# Patient Record
Sex: MALE | Race: WHITE | Employment: FULL TIME | URBAN - METROPOLITAN AREA
[De-identification: names, ages, dates, MRNs, and addresses within clinical notes are randomized per-mention and may not be internally consistent; named-entity substitution may affect disease eponyms.]

---

## 2019-07-29 ENCOUNTER — TRANSFERRED RECORDS (OUTPATIENT)
Dept: SURGERY | Facility: CLINIC | Age: 39
End: 2019-07-29

## 2019-09-11 ENCOUNTER — OFFICE VISIT (OUTPATIENT)
Dept: SURGERY | Facility: CLINIC | Age: 39
End: 2019-09-11
Payer: COMMERCIAL

## 2019-09-11 VITALS
WEIGHT: 158 LBS | RESPIRATION RATE: 16 BRPM | DIASTOLIC BLOOD PRESSURE: 70 MMHG | HEIGHT: 72 IN | HEART RATE: 69 BPM | SYSTOLIC BLOOD PRESSURE: 126 MMHG | BODY MASS INDEX: 21.4 KG/M2 | OXYGEN SATURATION: 98 %

## 2019-09-11 DIAGNOSIS — E04.1 THYROID NODULE: Primary | ICD-10-CM

## 2019-09-11 PROCEDURE — 99243 OFF/OP CNSLTJ NEW/EST LOW 30: CPT | Performed by: SURGERY

## 2019-09-11 RX ORDER — FLUTICASONE PROPIONATE 50 MCG
1 SPRAY, SUSPENSION (ML) NASAL
COMMUNITY
Start: 2016-07-26

## 2019-09-11 SDOH — HEALTH STABILITY: MENTAL HEALTH: HOW OFTEN DO YOU HAVE A DRINK CONTAINING ALCOHOL?: 2-4 TIMES A MONTH

## 2019-09-11 SDOH — HEALTH STABILITY: MENTAL HEALTH: HOW MANY STANDARD DRINKS CONTAINING ALCOHOL DO YOU HAVE ON A TYPICAL DAY?: 1 OR 2

## 2019-09-11 ASSESSMENT — MIFFLIN-ST. JEOR: SCORE: 1671.74

## 2019-09-11 NOTE — LETTER
"September 12, 2019          Maria Esther Welch MD  [facility] [place] [id number] [facility] [address]  [place] [address]      RE:   Long Rider 1980      Dear Colleague,    Thank you for referring your patient, Long Rider, to [facility]. Please see a copy of my visit note below.    History of Present Illness  Long Rider is a 39 year old male who is referred from Dr. Maria Esther Welch for surgery consultation regarding  multinodular goiter.  Long had a neck mass/thyroid nodule first identified in 2016.  It is enlarging and he is now symptomatic.     He denies fatigue, weight changes, heat/cold intolerance, bowel/skin changes or CVS symptoms.     He reports positional pressure neck discomfort.  He denies swallowing difficulty, shortness of breath, frequent throat clearing and hoarseness.     He does not have a family history of thyroid cancer.  He denies a personal history of radiation exposure.     Long is not on thyroid medications.  Thyroid medications include: None.  Long has no prior neck surgery..        Past Medical History   No past medical history on file.     Past Surgical History   No past surgical history on file.        Smoking History:  has never smoked.     Review Of Systems:    10 point ROS is negative except as stated in the History of the Present Illness      Physical Exam:  /70   Pulse 69   Resp 16   Ht 1.816 m (5' 11.5\")   Wt 71.7 kg (158 lb)   SpO2 98%   BMI 21.73 kg/m    Well developed, well nourished male in no apparent distress.  HEENT:  Normocephalic, atraumatic.                   Eyes without exophthalmos.  Neck:   Slender, long and Visible right goiter.              Range of motion is normal.              No neck masses.  Thyroid:  solitary nodule right at 5.5 cm, comes above the clavicles with swallowing..  Lymph:  No cervical adenopathy.  Respirations:  Unlabored.  Neurologic:  Alert.  Speech is clear.  Moves all " extremities with good strength  Skin:  Warm and dry.  Psychologic:  Alert and appropriate range of emotions.     Imaging:  All imaging personally reviewed with Long   Ultrasound: Right lobe with a 4.7 x 5.5 x 3.6 cm nodule, increased. Left lobe is normal except for a tiny 7 mm upper pole nodule and he has a 1.2 cm isthmus nodule.     Labs:  TSH 1.37     FNA Biopsy: benign (right) and non-diagnostic (isthmus) x 2.     Assessment and Plan:    Long has a sizable and symptomatic right  thyroid nodule .   I am recommending him for  right thyroid lobectomy and we would only perform a total thyroidectomy in the case of a proven malignancy.  The isthmus nodule can be removed with the right lobe.  Long is aware of the risks to the recurrent laryngeal nerve and to the parathyroid glands during surgery.  He is interested in proceeding with surgery sometime in the next several weeks.      Again, thank you for allowing me to participate in the care of your patient.      Sincerely,      Fidelia Vidal MD

## 2019-09-11 NOTE — PROGRESS NOTES
"History of Present Illness  Long Rider is a 39 year old male who is referred from Dr. Maria Esther Welch for surgery consultation regarding  multinodular goiter.  Long had a neck mass/thyroid nodule first identified in 2016.  It is enlarging and he is now symptomatic.    He denies fatigue, weight changes, heat/cold intolerance, bowel/skin changes or CVS symptoms.    He reports positional pressure neck discomfort.  He denies swallowing difficulty, shortness of breath, frequent throat clearing and hoarseness.    He does not have a family history of thyroid cancer.  He denies a personal history of radiation exposure.    Long is not on thyroid medications.  Thyroid medications include: None.  Long has no prior neck surgery..      No past medical history on file.  No past surgical history on file.    Smoking History:  has never smoked.    Review Of Systems:    10 point ROS is negative except as stated in the History of the Present Illness     Physical Exam:  /70   Pulse 69   Resp 16   Ht 1.816 m (5' 11.5\")   Wt 71.7 kg (158 lb)   SpO2 98%   BMI 21.73 kg/m    Well developed, well nourished male in no apparent distress.  HEENT:  Normocephalic, atraumatic.                   Eyes without exophthalmos.  Neck:   Slender, long and Visible right goiter.              Range of motion is normal.              No neck masses.  Thyroid:  solitary nodule right at 5.5 cm, comes above the clavicles with swallowing..  Lymph:  No cervical adenopathy.  Respirations:  Unlabored.  Neurologic:  Alert.  Speech is clear.  Moves all extremities with good strength  Skin:  Warm and dry.  Psychologic:  Alert and appropriate range of emotions.    Imaging:  All imaging personally reviewed with Long   Ultrasound: Right lobe with a 4.7 x 5.5 x 3.6 cm nodule, increased. Left lobe is normal except for a tiny 7 mm upper pole nodule and he has a 1.2 cm isthmus nodule.    Labs:  TSH 1.37    FNA Biopsy: benign (right) and non-diagnostic " (isthmus) x 2.    Assessment and Plan:    Long has a sizable and symptomatic right  thyroid nodule .   I am recommending him for  right thyroid lobectomy and we would only perform a total thyroidectomy in the case of a proven malignancy.  The isthmus nodule can be removed with the right lobe.  Long is aware of the risks to the recurrent laryngeal nerve and to the parathyroid glands during surgery.  He is interested in proceeding with surgery sometime in the next several weeks.    Fidelia Vidal MD  Please route or send dictated letter to:  Primary Care Provider (PCP) and Referring Provider    45 minutes spent with the patient, over 50% as counseling.

## 2019-09-12 ENCOUNTER — TELEPHONE (OUTPATIENT)
Dept: SURGERY | Facility: CLINIC | Age: 39
End: 2019-09-12

## 2019-09-12 NOTE — TELEPHONE ENCOUNTER
Type of surgery: Right thyroid lobectomy, possible total thyroidectomy  Location of surgery: [facility]  Date and time of surgery: 10/28/19 at 7:30am  Surgeon: Dr. Fidelia Vidal  Pre-Op Appt Date: Patient to schedule  Post-Op Appt Date: Patient to schedule   Packet sent out: Yes  Pre-cert/Authorization completed:  Not Applicable  Date: 9/12/19

## 2019-09-12 NOTE — PROGRESS NOTES
Service Date: 2019            Maria Esther Welch MD   [facility] [address] [address]      Re:  Long Rider,  1980      Dear Maria Esther:      Thank you for asking me to see Venkata Rider regarding his multinodular goiter with a dominant right thyroid nodule.  This pleasant 39-year-old male was first noted to have a neck mass/thyroid nodule in 2016.  It was noticed by his wife at that time.  It is enlarging and he is now symptomatic.  He does report positional neck discomfort but denies swallowing difficulty, shortness of breath or hoarseness.  There is no family history of thyroid cancer nor a personal history of radiation exposure.  He is not currently on thyroid medications and has had no prior thyroid surgery.      On exam, he has a long slender neck with a visible right-sided goiter.  Range of motion is normal and he has no adenopathy.  The thyroid is palpably normal on the left side. The right side has a solitary nodule measuring at least 5.5 cm.  It does come above the clavicles with swallowing.      The imaging confirms that the right-sided nodule is 5.5 cm.  The total size of the lobe is 6.5 cm in length.  It is increased since the initial imaging in 2016.  The left lobe was normal except for a tiny 7 mm upper pole nodule and he also has a 1.2 cm isthmus nodule.  His TSH is 1.37.  FNA biopsies of the right were benign and the isthmus was nondiagnostic x 2.      Venkata has a sizable and symptomatic right thyroid nodule in the setting of a multinodular goiter.  I am recommending him for right thyroid lobectomy and we would only perform a total thyroidectomy in the case of a proven malignancy.  The isthmus nodule can be removed with the right lobe.  Long is aware of the risks to the recurrent laryngeal nerve and parathyroids and is interested in proceeding with surgery sometime in the next several weeks.      Thank you  for the referral.            MD AUDRA Dickens MD             D: 2019   T: 2019   MT: ROMULO      Name:     RAINA MA   MRN:      [id number]        Account:      [id number]   :      1980           Service Date: 2019      Document: [id number]

## 2019-10-25 ASSESSMENT — MIFFLIN-ST. JEOR: SCORE: 1689.21

## 2019-10-28 ENCOUNTER — APPOINTMENT (OUTPATIENT)
Dept: SURGERY | Facility: PHYSICIAN GROUP | Age: 39
End: 2019-10-28
Payer: COMMERCIAL

## 2019-10-28 ENCOUNTER — ANESTHESIA (OUTPATIENT)
Dept: SURGERY | Facility: CLINIC | Age: 39
End: 2019-10-28
Payer: COMMERCIAL

## 2019-10-28 ENCOUNTER — ANESTHESIA EVENT (OUTPATIENT)
Dept: SURGERY | Facility: CLINIC | Age: 39
End: 2019-10-28
Payer: COMMERCIAL

## 2019-10-28 ENCOUNTER — HOSPITAL ENCOUNTER (OUTPATIENT)
Facility: CLINIC | Age: 39
Discharge: HOME | End: 2019-10-29
Attending: SURGERY | Admitting: SURGERY
Payer: COMMERCIAL

## 2019-10-28 DIAGNOSIS — Z98.890 S/P THYROID SURGERY: Primary | ICD-10-CM

## 2019-10-28 PROBLEM — E04.1 THYROID NODULE: Status: ACTIVE | Noted: 2019-10-28

## 2019-10-28 PROCEDURE — 88307 TISSUE EXAM BY PATHOLOGIST: CPT | Performed by: SURGERY

## 2019-10-28 PROCEDURE — 27210794 ZZH OR GENERAL SUPPLY STERILE: Performed by: SURGERY

## 2019-10-28 PROCEDURE — 25000128 H RX IP 250 OP 636: Performed by: SURGERY

## 2019-10-28 PROCEDURE — 25000128 H RX IP 250 OP 636: Performed by: NURSE ANESTHETIST, CERTIFIED REGISTERED

## 2019-10-28 PROCEDURE — 25800030 ZZH RX IP 258 OP 636: Performed by: NURSE ANESTHETIST, CERTIFIED REGISTERED

## 2019-10-28 PROCEDURE — 60220 PARTIAL REMOVAL OF THYROID: CPT | Mod: AS | Performed by: PHYSICIAN ASSISTANT

## 2019-10-28 PROCEDURE — 60220 PARTIAL REMOVAL OF THYROID: CPT | Mod: RT | Performed by: SURGERY

## 2019-10-28 PROCEDURE — 88342 IMHCHEM/IMCYTCHM 1ST ANTB: CPT | Performed by: SURGERY

## 2019-10-28 PROCEDURE — 25000128 H RX IP 250 OP 636: Performed by: PHYSICIAN ASSISTANT

## 2019-10-28 PROCEDURE — 88342 IMHCHEM/IMCYTCHM 1ST ANTB: CPT | Mod: 26 | Performed by: SURGERY

## 2019-10-28 PROCEDURE — 71000012 ZZH RECOVERY PHASE 1 LEVEL 1 FIRST HR: Performed by: SURGERY

## 2019-10-28 PROCEDURE — 25000125 ZZHC RX 250: Performed by: NURSE ANESTHETIST, CERTIFIED REGISTERED

## 2019-10-28 PROCEDURE — 88331 PATH CONSLTJ SURG 1 BLK 1SPC: CPT | Performed by: SURGERY

## 2019-10-28 PROCEDURE — 25800030 ZZH RX IP 258 OP 636: Performed by: ANESTHESIOLOGY

## 2019-10-28 PROCEDURE — 88331 PATH CONSLTJ SURG 1 BLK 1SPC: CPT | Mod: 26 | Performed by: SURGERY

## 2019-10-28 PROCEDURE — 25000125 ZZHC RX 250: Performed by: SURGERY

## 2019-10-28 PROCEDURE — 88341 IMHCHEM/IMCYTCHM EA ADD ANTB: CPT | Performed by: SURGERY

## 2019-10-28 PROCEDURE — 37000008 ZZH ANESTHESIA TECHNICAL FEE, 1ST 30 MIN: Performed by: SURGERY

## 2019-10-28 PROCEDURE — 36000063 ZZH SURGERY LEVEL 4 EA 15 ADDTL MIN: Performed by: SURGERY

## 2019-10-28 PROCEDURE — 25000132 ZZH RX MED GY IP 250 OP 250 PS 637: Performed by: SURGERY

## 2019-10-28 PROCEDURE — 88341 IMHCHEM/IMCYTCHM EA ADD ANTB: CPT | Mod: 26 | Performed by: SURGERY

## 2019-10-28 PROCEDURE — 37000009 ZZH ANESTHESIA TECHNICAL FEE, EACH ADDTL 15 MIN: Performed by: SURGERY

## 2019-10-28 PROCEDURE — 88307 TISSUE EXAM BY PATHOLOGIST: CPT | Mod: 26 | Performed by: SURGERY

## 2019-10-28 PROCEDURE — 40000306 ZZH STATISTIC PRE PROC ASSESS II: Performed by: SURGERY

## 2019-10-28 PROCEDURE — 36000093 ZZH SURGERY LEVEL 4 1ST 30 MIN: Performed by: SURGERY

## 2019-10-28 RX ORDER — PROPOFOL 10 MG/ML
INJECTION, EMULSION INTRAVENOUS PRN
Status: DISCONTINUED | OUTPATIENT
Start: 2019-10-28 | End: 2019-10-28

## 2019-10-28 RX ORDER — LIDOCAINE HYDROCHLORIDE 10 MG/ML
INJECTION, SOLUTION INFILTRATION; PERINEURAL PRN
Status: DISCONTINUED | OUTPATIENT
Start: 2019-10-28 | End: 2019-10-28

## 2019-10-28 RX ORDER — ONDANSETRON 4 MG/1
4 TABLET, ORALLY DISINTEGRATING ORAL EVERY 6 HOURS PRN
Status: DISCONTINUED | OUTPATIENT
Start: 2019-10-28 | End: 2019-10-29 | Stop reason: HOSPADM

## 2019-10-28 RX ORDER — ACETAMINOPHEN 325 MG/1
650 TABLET ORAL EVERY 6 HOURS PRN
Status: DISCONTINUED | OUTPATIENT
Start: 2019-10-28 | End: 2019-10-29 | Stop reason: HOSPADM

## 2019-10-28 RX ORDER — LIDOCAINE 40 MG/G
CREAM TOPICAL
Status: DISCONTINUED | OUTPATIENT
Start: 2019-10-28 | End: 2019-10-29 | Stop reason: HOSPADM

## 2019-10-28 RX ORDER — HYDROMORPHONE HYDROCHLORIDE 1 MG/ML
.3-.5 INJECTION, SOLUTION INTRAMUSCULAR; INTRAVENOUS; SUBCUTANEOUS EVERY 10 MIN PRN
Status: DISCONTINUED | OUTPATIENT
Start: 2019-10-28 | End: 2019-10-28 | Stop reason: HOSPADM

## 2019-10-28 RX ORDER — NALOXONE HYDROCHLORIDE 0.4 MG/ML
.1-.4 INJECTION, SOLUTION INTRAMUSCULAR; INTRAVENOUS; SUBCUTANEOUS
Status: DISCONTINUED | OUTPATIENT
Start: 2019-10-28 | End: 2019-10-28 | Stop reason: HOSPADM

## 2019-10-28 RX ORDER — ONDANSETRON 2 MG/ML
4 INJECTION INTRAMUSCULAR; INTRAVENOUS EVERY 30 MIN PRN
Status: DISCONTINUED | OUTPATIENT
Start: 2019-10-28 | End: 2019-10-28 | Stop reason: HOSPADM

## 2019-10-28 RX ORDER — ALBUTEROL SULFATE 0.83 MG/ML
2.5 SOLUTION RESPIRATORY (INHALATION) EVERY 4 HOURS PRN
Status: DISCONTINUED | OUTPATIENT
Start: 2019-10-28 | End: 2019-10-28 | Stop reason: HOSPADM

## 2019-10-28 RX ORDER — SODIUM CHLORIDE, SODIUM LACTATE, POTASSIUM CHLORIDE, CALCIUM CHLORIDE 600; 310; 30; 20 MG/100ML; MG/100ML; MG/100ML; MG/100ML
INJECTION, SOLUTION INTRAVENOUS CONTINUOUS
Status: DISCONTINUED | OUTPATIENT
Start: 2019-10-28 | End: 2019-10-29 | Stop reason: HOSPADM

## 2019-10-28 RX ORDER — DEXAMETHASONE SODIUM PHOSPHATE 4 MG/ML
INJECTION, SOLUTION INTRA-ARTICULAR; INTRALESIONAL; INTRAMUSCULAR; INTRAVENOUS; SOFT TISSUE PRN
Status: DISCONTINUED | OUTPATIENT
Start: 2019-10-28 | End: 2019-10-28

## 2019-10-28 RX ORDER — FENTANYL CITRATE 50 UG/ML
INJECTION, SOLUTION INTRAMUSCULAR; INTRAVENOUS PRN
Status: DISCONTINUED | OUTPATIENT
Start: 2019-10-28 | End: 2019-10-28

## 2019-10-28 RX ORDER — SODIUM CHLORIDE, SODIUM LACTATE, POTASSIUM CHLORIDE, CALCIUM CHLORIDE 600; 310; 30; 20 MG/100ML; MG/100ML; MG/100ML; MG/100ML
INJECTION, SOLUTION INTRAVENOUS CONTINUOUS
Status: DISCONTINUED | OUTPATIENT
Start: 2019-10-28 | End: 2019-10-28 | Stop reason: HOSPADM

## 2019-10-28 RX ORDER — CEFAZOLIN SODIUM 1 G/3ML
1 INJECTION, POWDER, FOR SOLUTION INTRAMUSCULAR; INTRAVENOUS SEE ADMIN INSTRUCTIONS
Status: DISCONTINUED | OUTPATIENT
Start: 2019-10-28 | End: 2019-10-28 | Stop reason: HOSPADM

## 2019-10-28 RX ORDER — MAGNESIUM HYDROXIDE 1200 MG/15ML
LIQUID ORAL PRN
Status: DISCONTINUED | OUTPATIENT
Start: 2019-10-28 | End: 2019-10-28 | Stop reason: HOSPADM

## 2019-10-28 RX ORDER — PROCHLORPERAZINE MALEATE 5 MG
10 TABLET ORAL EVERY 6 HOURS PRN
Status: DISCONTINUED | OUTPATIENT
Start: 2019-10-28 | End: 2019-10-29 | Stop reason: HOSPADM

## 2019-10-28 RX ORDER — MEPERIDINE HYDROCHLORIDE 50 MG/ML
12.5 INJECTION INTRAMUSCULAR; INTRAVENOUS; SUBCUTANEOUS
Status: DISCONTINUED | OUTPATIENT
Start: 2019-10-28 | End: 2019-10-28 | Stop reason: HOSPADM

## 2019-10-28 RX ORDER — HYDROCODONE BITARTRATE AND ACETAMINOPHEN 5; 325 MG/1; MG/1
1-2 TABLET ORAL EVERY 4 HOURS PRN
Status: DISCONTINUED | OUTPATIENT
Start: 2019-10-28 | End: 2019-10-29 | Stop reason: HOSPADM

## 2019-10-28 RX ORDER — ONDANSETRON 4 MG/1
4 TABLET, ORALLY DISINTEGRATING ORAL EVERY 30 MIN PRN
Status: DISCONTINUED | OUTPATIENT
Start: 2019-10-28 | End: 2019-10-28 | Stop reason: HOSPADM

## 2019-10-28 RX ORDER — CEFAZOLIN SODIUM 2 G/100ML
2 INJECTION, SOLUTION INTRAVENOUS
Status: COMPLETED | OUTPATIENT
Start: 2019-10-28 | End: 2019-10-28

## 2019-10-28 RX ORDER — BUPIVACAINE HYDROCHLORIDE 2.5 MG/ML
INJECTION, SOLUTION EPIDURAL; INFILTRATION; INTRACAUDAL PRN
Status: DISCONTINUED | OUTPATIENT
Start: 2019-10-28 | End: 2019-10-28 | Stop reason: HOSPADM

## 2019-10-28 RX ORDER — FLUTICASONE PROPIONATE 50 MCG
1 SPRAY, SUSPENSION (ML) NASAL DAILY
Status: DISCONTINUED | OUTPATIENT
Start: 2019-10-28 | End: 2019-10-29 | Stop reason: HOSPADM

## 2019-10-28 RX ORDER — FENTANYL CITRATE 50 UG/ML
25-50 INJECTION, SOLUTION INTRAMUSCULAR; INTRAVENOUS EVERY 5 MIN PRN
Status: DISCONTINUED | OUTPATIENT
Start: 2019-10-28 | End: 2019-10-28 | Stop reason: HOSPADM

## 2019-10-28 RX ORDER — NALOXONE HYDROCHLORIDE 0.4 MG/ML
.1-.4 INJECTION, SOLUTION INTRAMUSCULAR; INTRAVENOUS; SUBCUTANEOUS
Status: DISCONTINUED | OUTPATIENT
Start: 2019-10-28 | End: 2019-10-29 | Stop reason: HOSPADM

## 2019-10-28 RX ORDER — HYDRALAZINE HYDROCHLORIDE 20 MG/ML
2.5-5 INJECTION INTRAMUSCULAR; INTRAVENOUS EVERY 10 MIN PRN
Status: DISCONTINUED | OUTPATIENT
Start: 2019-10-28 | End: 2019-10-28 | Stop reason: HOSPADM

## 2019-10-28 RX ORDER — KETOROLAC TROMETHAMINE 30 MG/ML
30 INJECTION, SOLUTION INTRAMUSCULAR; INTRAVENOUS EVERY 6 HOURS PRN
Status: DISCONTINUED | OUTPATIENT
Start: 2019-10-28 | End: 2019-10-28 | Stop reason: HOSPADM

## 2019-10-28 RX ORDER — OXYCODONE HYDROCHLORIDE 5 MG/1
5 TABLET ORAL EVERY 4 HOURS PRN
Status: DISCONTINUED | OUTPATIENT
Start: 2019-10-28 | End: 2019-10-28

## 2019-10-28 RX ORDER — LIDOCAINE 40 MG/G
CREAM TOPICAL
Status: DISCONTINUED | OUTPATIENT
Start: 2019-10-28 | End: 2019-10-28 | Stop reason: HOSPADM

## 2019-10-28 RX ORDER — EPHEDRINE SULFATE 50 MG/ML
INJECTION, SOLUTION INTRAMUSCULAR; INTRAVENOUS; SUBCUTANEOUS PRN
Status: DISCONTINUED | OUTPATIENT
Start: 2019-10-28 | End: 2019-10-28

## 2019-10-28 RX ORDER — HYDROMORPHONE HYDROCHLORIDE 1 MG/ML
0.2 INJECTION, SOLUTION INTRAMUSCULAR; INTRAVENOUS; SUBCUTANEOUS
Status: DISCONTINUED | OUTPATIENT
Start: 2019-10-28 | End: 2019-10-29 | Stop reason: HOSPADM

## 2019-10-28 RX ORDER — ONDANSETRON 2 MG/ML
4 INJECTION INTRAMUSCULAR; INTRAVENOUS EVERY 6 HOURS PRN
Status: DISCONTINUED | OUTPATIENT
Start: 2019-10-28 | End: 2019-10-29 | Stop reason: HOSPADM

## 2019-10-28 RX ORDER — FENTANYL CITRATE 50 UG/ML
25-50 INJECTION, SOLUTION INTRAMUSCULAR; INTRAVENOUS
Status: DISCONTINUED | OUTPATIENT
Start: 2019-10-28 | End: 2019-10-28 | Stop reason: HOSPADM

## 2019-10-28 RX ORDER — METOPROLOL TARTRATE 1 MG/ML
1-2 INJECTION, SOLUTION INTRAVENOUS EVERY 5 MIN PRN
Status: DISCONTINUED | OUTPATIENT
Start: 2019-10-28 | End: 2019-10-28 | Stop reason: HOSPADM

## 2019-10-28 RX ADMIN — HYDROCODONE BITARTRATE AND ACETAMINOPHEN 1 TABLET: 5; 325 TABLET ORAL at 20:54

## 2019-10-28 RX ADMIN — Medication 100 MG: at 07:38

## 2019-10-28 RX ADMIN — Medication 5 MG: at 07:38

## 2019-10-28 RX ADMIN — DEXAMETHASONE SODIUM PHOSPHATE 4 MG: 4 INJECTION, SOLUTION INTRA-ARTICULAR; INTRALESIONAL; INTRAMUSCULAR; INTRAVENOUS; SOFT TISSUE at 07:38

## 2019-10-28 RX ADMIN — FENTANYL CITRATE 100 MCG: 50 INJECTION, SOLUTION INTRAMUSCULAR; INTRAVENOUS at 07:38

## 2019-10-28 RX ADMIN — LIDOCAINE HYDROCHLORIDE 50 MG: 10 INJECTION, SOLUTION INFILTRATION; PERINEURAL at 07:38

## 2019-10-28 RX ADMIN — Medication 5 MG: at 08:38

## 2019-10-28 RX ADMIN — SODIUM CHLORIDE, POTASSIUM CHLORIDE, SODIUM LACTATE AND CALCIUM CHLORIDE: 600; 310; 30; 20 INJECTION, SOLUTION INTRAVENOUS at 07:13

## 2019-10-28 RX ADMIN — Medication 5 MG: at 08:44

## 2019-10-28 RX ADMIN — PROPOFOL 200 MG: 10 INJECTION, EMULSION INTRAVENOUS at 07:38

## 2019-10-28 RX ADMIN — ACETAMINOPHEN 650 MG: 325 TABLET, FILM COATED ORAL at 18:40

## 2019-10-28 RX ADMIN — PHENYLEPHRINE HYDROCHLORIDE 100 MCG: 10 INJECTION INTRAVENOUS at 08:47

## 2019-10-28 RX ADMIN — DEXMEDETOMIDINE HYDROCHLORIDE 0.4 MCG: 100 INJECTION, SOLUTION INTRAVENOUS at 07:50

## 2019-10-28 RX ADMIN — CEFAZOLIN SODIUM 2 G: 2 INJECTION, SOLUTION INTRAVENOUS at 07:50

## 2019-10-28 RX ADMIN — PHENYLEPHRINE HYDROCHLORIDE 100 MCG: 10 INJECTION INTRAVENOUS at 08:32

## 2019-10-28 RX ADMIN — SODIUM CHLORIDE, POTASSIUM CHLORIDE, SODIUM LACTATE AND CALCIUM CHLORIDE: 600; 310; 30; 20 INJECTION, SOLUTION INTRAVENOUS at 08:51

## 2019-10-28 RX ADMIN — MIDAZOLAM 2 MG: 1 INJECTION INTRAMUSCULAR; INTRAVENOUS at 07:33

## 2019-10-28 RX ADMIN — PHENYLEPHRINE HYDROCHLORIDE 100 MCG: 10 INJECTION INTRAVENOUS at 09:05

## 2019-10-28 RX ADMIN — ONDANSETRON 4 MG: 4 TABLET, ORALLY DISINTEGRATING ORAL at 19:16

## 2019-10-28 RX ADMIN — Medication 5 MG: at 08:56

## 2019-10-28 ASSESSMENT — MIFFLIN-ST. JEOR: SCORE: 1678.09

## 2019-10-28 NOTE — ANESTHESIA POSTPROCEDURE EVALUATION
Patient: Long Rider    Procedure(s):  Right Thyroid Lobectomy, Isthmusectomy    Diagnosis:right thyroid symptomatic nodule  Diagnosis Additional Information: No value filed.    Anesthesia Type:  General, ETT    Note:  Anesthesia Post Evaluation    Patient location during evaluation: PACU  Patient participation: Able to fully participate in evaluation  Level of consciousness: awake  Pain management: adequate  Airway patency: patent  Cardiovascular status: acceptable  Respiratory status: acceptable  Hydration status: euvolemic  PONV: controlled     Anesthetic complications: None          Last vitals:  Vitals:    10/28/19 1143 10/28/19 1233 10/28/19 1341   BP: (!) 140/73 131/60 118/63   Pulse: 92 91 95   Resp: 18 18 18   Temp: 96.3  F (35.7  C) 97.1  F (36.2  C) 98.2  F (36.8  C)   SpO2: 97% 99% 99%         Electronically Signed By: Kyle Ibanez MD  October 28, 2019  2:43 PM

## 2019-10-28 NOTE — PLAN OF CARE
"PRIMARY DIAGNOSIS: s/p R thyroidectomy  OUTPATIENT/OBSERVATION GOALS TO BE MET BEFORE DISCHARGE:  1. Stable vital signs Yes  /60 (BP Location: Right arm)   Pulse 91   Temp 97.1  F (36.2  C) (Oral)   Resp 18   Ht 1.819 m (5' 11.61\")   Wt 72.1 kg (159 lb)   SpO2 99%   BMI 21.80 kg/m    2. Tolerating diet: pt tolerating clear liquids. Does not wish to advance diet at this time.  3. Pain controlled with oral pain medications: pt reporting mild throat pain, using ice chips and cold compress for pain relief. Declines additional pain interventions.  4. Positive bowel sounds: Yes  5. Voiding without difficulty: kirby catheter in place  6. Able to ambulate: Yes  7. Provider specific discharge goals met: No    Discharge Planner Nurse   Safe discharge environment identified: Yes  Barriers to discharge: No       Entered by: Whitney Elder 10/28/2019 12:39 PM     Please review provider order for any additional goals.   Nurse to notify provider when observation goals have been met and patient is ready for discharge.      A&Ox4.  Pt resting in bed, reporting mild throat discomfort, using ice chips and cold compress for pain relief. Pt tolerating PO fluids, does not want to start IVF, will continue to encourage PO intake.  CAMILA drain to bulb suction with minimal bloody output. Dressing on neck CDI.  Kirby in place with clear yellow output.  Family at bedside.  "

## 2019-10-28 NOTE — OP NOTE
General Surgery Operative Note    PREOPERATIVE DIAGNOSIS:  Right thyroid symptomatic nodule    POSTOPERATIVE DIAGNOSIS:  Right thyroid symptomatic nodule    PROCEDURE:   Right Thyroid Lobectomy    ANESTHESIA:  General.    PREOPERATIVE MEDICATIONS:  Ancef 2 gm    SURGEON:  Fidelia Vidal MD    ASSISTANT:  Saray Calixto PA-C,  - the physician assistant was medically necessary in providing adequate exposure in the operating field, maintaining hemostasis, cutting suture, clamping and ligating blood vessels, and visualization of the anatomic structures throughout the surgical procedure.    ESTIMATED BLOOD LOSS:  25 cc    INDICATIONS:  Long Rider is a 39 year old male with a thyroid nodule for three years.  It is increasing in size and is symptomatic.  A fine needle aspiration biopsy showed that it is benign.  It is 5.5 cm and he is here today for right thyroid lobectomy, possible total thyroidectomy.    PROCEDURE:  Long was placed supine, head and neck in extension and a bump between the scapulas.  Tansverse cervical neck creases had been marked in the preinduction area and the one most suitable was utilized for exposure.  Superior and inferior skin flaps raised.  Midline fascia opened and reflected to the right.   The right lobe was nearly replaced with a large smooth nodule.  We did have to partially divide the strap muscles for exposure.  The upper pole was taken down by double ligation and division.  Middle thyroidal vein was ligated and the inferior pole veins ligated and the gland reflected medially, up and out of the incision.  Posterior dissection was then done under direct vision after the gland was reflected medially.  The superior parathyroid and the recurrent laryngeal nerve were seen and preserved during that posterior dissection.  The ligament of Saldana was taken down meticulously.  The inferior thyroidal artery was ligated and divided.  The inferior parathyroid was also seen and preserved.   Once we dissected across the anterior trachea, we divided through the isthmus just to the left of a 1.2 cm isthmus nodule and frozen section confirmed a benign appearing follicular lesion.  We then irrigated the site, inspected for hemostasis and closed over a 10 round CAMILA drain with 2-0 Vicryl U stitch for the strap muscle, running 3-0 Vicryl for the midline fascia, interrupted 3-0 Vicryl for platysma and 4-0 subcuticular Monocryl for skin.  The patient was transferred to recovery in good condition.    INTRAOPERATIVE FINDINGS:    1.  Thyroid benign by frozen section  2.   Right recurrent laryngeal nerve seen and preserved.  Function confirmed by nerve monitor.  3.  Right superior and inferior parathyroid glands seen and preserved.    Specimens:   ID Type Source Tests Collected by Time Destination   A : RIGHT THYROID LOBE AND ISTHMUS, suture marks superior pole Tissue Thyroid, Right SURGICAL PATHOLOGY EXAM Fidelia Vidal MD 10/28/2019  9:20 AM        Fidelia Vidal MD

## 2019-10-28 NOTE — ANESTHESIA PREPROCEDURE EVALUATION
"Anesthesia Pre-Procedure Evaluation    Patient: Long Rider   MRN: [id number] : 1980          Preoperative Diagnosis: right thyroid symptomatic nodule    Procedure(s):  right thyroid lobectomy, possible total thyroidectomy    Past Medical History:   Diagnosis Date     Thyroid disease      History reviewed. No pertinent surgical history.  Anesthesia Evaluation     . Pt has not had prior anesthetic            ROS/MED HX    ENT/Pulmonary:  - neg pulmonary ROS    (-) sleep apnea   Neurologic:       Cardiovascular:  - neg cardiovascular ROS       METS/Exercise Tolerance:     Hematologic:         Musculoskeletal:         GI/Hepatic:        (-) GERD   Renal/Genitourinary:         Endo:     (+) thyroid problem (5.5 cm nodule, no difficulty swallowing, breathing or hoarseness) .      Psychiatric:         Infectious Disease:         Malignancy:         Other:                          Physical Exam      Airway   Mallampati: II  TM distance: >3 FB  Neck ROM: full    Dental     Cardiovascular   Rhythm and rate: regular and normal      Pulmonary    breath sounds clear to auscultation            No results found for: WBC, HGB, HCT, PLT, CRP, SED, NA, POTASSIUM, CHLORIDE, CO2, BUN, CR, GLC, RAFAEL, PHOS, MAG, ALBUMIN, PROTTOTAL, ALT, AST, GGT, ALKPHOS, BILITOTAL, BILIDIRECT, LIPASE, AMYLASE, CONNIE, PTT, INR, FIBR, TSH, T4, T3, HCG, HCGS, CKTOTAL, CKMB, TROPN    Preop Vitals  BP Readings from Last 3 Encounters:   10/28/19 (!) 145/88   19 126/70    Pulse Readings from Last 3 Encounters:   19 69      Resp Readings from Last 3 Encounters:   10/28/19 16   19 16    SpO2 Readings from Last 3 Encounters:   10/28/19 100%   19 98%      Temp Readings from Last 1 Encounters:   10/28/19 98.6  F (37  C) (Temporal)    Ht Readings from Last 1 Encounters:   10/28/19 1.819 m (5' 11.61\")      Wt Readings from Last 1 Encounters:   10/28/19 72.1 kg (159 lb)    Estimated body mass index is 21.8 kg/m  as calculated from the " "following:    Height as of this encounter: 1.819 m (5' 11.61\").    Weight as of this encounter: 72.1 kg (159 lb).       Anesthesia Plan      History & Physical Review  History and physical reviewed and following examination; no interval change.    ASA Status:  2 .    NPO Status:  > 8 hours    Plan for General and ETT with Intravenous and Propofol induction. Maintenance will be Balanced.    PONV prophylaxis:  Ondansetron (or other 5HT-3)  Additional equipment: Videolaryngoscope      Postoperative Care  Postoperative pain management:  IV analgesics, Oral pain medications and Multi-modal analgesia.      Consents  Anesthetic plan, risks, benefits and alternatives discussed with:  Patient..                 Trenton Loredo MD                    .  "

## 2019-10-28 NOTE — PHARMACY-ADMISSION MEDICATION HISTORY
Med rec was done by pre-admitting nurse as below;    Nurse Complete Devi Edmond, RN Tue Oct 22, 2019 11:09 AM    Devi Edmond RN Tu Oct 22, 2019 11:08 AM     Med rec was reviewed by Linda Donaldson PharmD October 28, 2019       Prior to Admission medications    Medication Sig Last Dose Taking? Auth Provider   fluticasone (FLONASE) 50 MCG/ACT nasal spray Spray 1 spray in nostril 10/27/2019 at Unknown time Yes Reported, Patient

## 2019-10-28 NOTE — ANESTHESIA CARE TRANSFER NOTE
Patient: Long Rider    Procedure(s):  Right Thyroid Lobectomy, Isthmusectomy    Diagnosis: right thyroid symptomatic nodule  Diagnosis Additional Information: No value filed.    Anesthesia Type:   General, ETT     Note:  Airway :Face Mask  Patient transferred to:PACU  Comments: Pt SV good tidal volume, op sxn cuff down extubated.  Transfer to pacu.  Report to PACU RN transfer care. Handoff Report: Identifed the Patient, Identified the Reponsible Provider, Reviewed the pertinent medical history, Discussed the surgical course, Reviewed Intra-OP anesthesia mangement and issues during anesthesia, Set expectations for post-procedure period and Allowed opportunity for questions and acknowledgement of understandingpost-procedure handoff checklist not completed for medical reasons      Vitals: (Last set prior to Anesthesia Care Transfer)    CRNA VITALS  10/28/2019 0925 - 10/28/2019 1001      10/28/2019             Pulse:  86    SpO2:  99 %    Resp Rate (observed):       14                  Electronically Signed By: STEPHEN Ch CRNA  October 28, 2019  10:01 AM

## 2019-10-28 NOTE — DISCHARGE INSTRUCTIONS
HOME CARE FOLLOWING THYROIDECTOMY/LOBECTOMY  Tanvir Ortez, KE Costa    Special instructions for Long Rider:  --Discharge medications: Norco    --Follow up appointment with Dr. Vidal in 1-3 weeks, follow up with Endocrinologist in 4-6 weeks.     RESULTS:  Call the office regarding your final pathology report if you have not received your results after 2 full business days.     INCISIONAL CARE:    You may remove the dressing 24 hours after the drain was removed; replace the gauze if it becomes soiled.    You may expect a small amount of drainage from your previous drain site.    After removing the dressing, you may shower.  Do not submerse incision in water for 1 week.    Sutures will absorb and do not need to be removed.    Leave the steri-strips (white paper tapes) in place until they fall off, or remove at 2 weeks after surgery.    A lump/ridge under the incision is normal and will gradually resolve.    ACTIVITY:  Light Activity -- you may immediately be up and about as tolerated.  Driving -- you may drive when comfortable and off narcotic pain medications.  Light Work -- resume when comfortable off pain medications.  (If you can drive, you probably can work.)  Strenuous Work/Activity -- limit lifting to less than 10 pounds for 1 week.  Progressively increase with time.  Active Sports (running, biking, etc.) -- cautiously resume after 1 week.    DIET:  No restrictions.  Increased fluid intake is recommended. While taking pain medications, increase dietary fiber or add a fiber supplementation like Metamucil or Citrucel to help prevent constipation - a possible side effect of pain medications.    DISCOMFORT:  Use pain medications as prescribed by your surgeon.  Take the pain medication with some food, when possible, to minimize side effects.  Intermittent use of ice packs may help during the first 48 hours.  Expect gradual improvement.    CALCIUM SUPPLEMENTATION:  Some patients are  prescribed to take a calcium supplement after surgery.  Please take as prescribed.  Watch for symptoms of numbness or tingling around the mouth or in the fingers or toes.  This may be a sign of a low calcium level. If this happens, take an extra dose of your calcium supplement.  If the symptoms persist, please contact the office.  You may need to have your blood calcium level checked by doing a simple blood test.  We may also need to make adjustments in your calcium dose.  **Not all patients require calcium monitoring and supplementation post-op.  If your calcium was monitored and stabilized after surgery, the risk of having issues with low calcium once you are home is very small.    RETURN APPOINTMENT:  Schedule a follow-up visit 1-3 weeks post-op (you may do this any time after surgery is scheduled).  Office Phone:  [phone]       CONTACT US IF THE FOLLOWING DEVELOPS:   1. A fever that is above 101     2. If there is a large amount of drainage, bleeding, or swelling.   3. Severe pain that is not relieved by your prescription.   4. Drainage that is thick, cloudy, yellow, green or white.   5. Any other questions not answered by  Frequently Asked Questions  sheet.      FREQUENTLY ASKED QUESTIONS:    Q:  How should my incision look?    A:  Normally your incision will appear slightly swollen with light redness directly along the incision itself as it heals.  It may feel like a bump or ridge as the healing/scarring happens, and over time (3-4 months) this bump or ridge feeling should slowly go away.  In general, clear or pink watery drainage can be normal at first as your incision heals, but should decrease over time.    Q:  How do I know if my incision is infected?  A:  Look at your incision for signs of infection, like redness around the incision spreading to surrounding skin, or drainage of cloudy or foul-smelling drainage.  If you feel warm, check your temperature to see if you are running a fever.    **If any  of these things occur, please notify the nurse at our office.  We may need you to come into the office for an incision check.      Q:  How do I take care of my incision?  A:  If you have a dressing in place - Starting the day after surgery, replace the dressing 1-2 times a day until there is no further drainage from the incision.  At that time, a dressing is no longer needed.  Try to minimize tape on the skin if irritation is occurring at the tape sites.  If you have significant irritation from tape on the skin, please call the office to discuss other method of dressing your incision.    Small pieces of tape called  steri-strips  may be present directly overlying your incision; these may be removed 10 days after surgery unless otherwise specified by your surgeon.  If these tapes start to loosen at the ends, you may trim them back until they fall off or are removed.      Q:  There is a piece of tape or a sticky  lead  still on my skin.  Can I remove this?  A:  Sometimes the sticky  leads  used for monitoring during surgery or for evaluation in the emergency department are not all removed while you are in the hospital.  These sometimes have a tab or metal dot on them.  You can easily remove these on your own, like taking off a band-aid.  If there is a gel substance under the  lead , simply wipe/clean it off with a washcloth or paper towel.      Q:  What can I do to minimize constipation (very hard stools, or lack of stools)?  A:  Stay well hydrated.  Increase your dietary fiber intake or take a fiber supplement -with plenty of water.  Walk around frequently.  You may consider an over-the-counter stool-softener.  Your Pharmacist can assist you with choosing one that is stocked at your pharmacy.  Constipation is also one of the most common side effects of pain medication.  If you are using pain medication, be pro-active and try to PREVENT problems with constipation by taking the steps above BEFORE constipation becomes a  problem.    Q:  What do I do if I need more pain medications?  A:  Call the office to receive refills.  Be aware that certain pain meds cannot be called into a pharmacy and actually require a paper prescription.  A change may be made in your pain med as you progress thru your recovery period or if you have side effects to certain meds.    --Pain meds are NOT refilled after 5pm on weekdays, and NOT AT ALL on the weekends, so please look ahead to prevent problems.      Q:  Why am I having a hard time sleeping now that I am at home?  A:  Many medications you receive while you are in the hospital can impact your sleep for a number of days after your surgery/hospitalization.  Decreased level of activity and naps during the day may also make sleeping at night difficult.  Try to minimize day-time naps, and get up frequently during the day to walk around your home during your recovery time.  Sleep aides may be of some help, but are not recommended for long-term use.      Q:  I am having some back discomfort.  What should I do?  A:  This may be related to certain positioning that was required for your surgery, extended periods of time in bed, or other changes in your overall activity level.  You may try ice, heat, acetaminophen, or ibuprofen to treat this temporarily.  Note that many pain medications have acetaminophen in them and would state this on the prescription bottle.  Be sure not to exceed the maximum of 4000mg per day of acetaminophen.     **If the pain you are having does not resolve, is severe, or is a flare of back pain you have had on other occasions prior to surgery, please contact your primary physician for further recommendations or for an appointment to be examined at their office.    Q:  Why am I having headaches?  A:  Headaches can be caused by many things:  caffeine withdrawal, use of pain meds, dehydration, high blood pressure, lack of sleep, over-activity/exhaustion, flare-up of usual migraine  headaches.  If you feel this is related to muscle tension (a band-like feeling around the head, or a pressure at the low-back of the head) you may try ice or heat to this area.  You may need to drink more fluids (try electrolyte drink like Gatorade), rest, or take your usual migraine medications.   **If your headaches do not resolve, worsen, are accompanied by other symptoms, or if your blood pressure is high, please call your primary physician for recommendation and/or examination.    Q:  I am unable to urinate.  What do I do?  A:  A small percentage of people can have difficulty urinating initially after surgery.  This includes being able to urinate only a very small amount at a time and feeling discomfort or pressure in the very low abdomen.  This is called  urinary retention , and is actually an urgent situation.  Proceed to your nearest Emergency department for evaluation (not an Urgent Care Center).  Sometimes the bladder does not work correctly after certain medications you receive during surgery, or related to certain procedures.  You may need to have a catheter placed until your bladder recovers.  When planning to go to an Emergency department, it may help to call the ER to let them know you are coming in for this problem after a surgery.  This may help you get in quicker to be evaluated.  **If you have symptoms of a urinary tract infection, please contact your primary physician for the proper evaluation and treatment.          If you have other questions, please call the office Monday thru Friday between 8am and 5pm to discuss with the nurse or physician assistant.  [phone]    There is a surgeon ON CALL on weekday evenings and over the weekend in case of urgent need only, and may be contacted at the same number.    If you are having an emergency, call 911 or proceed to your nearest emergency department.

## 2019-10-29 VITALS
WEIGHT: 159 LBS | SYSTOLIC BLOOD PRESSURE: 115 MMHG | DIASTOLIC BLOOD PRESSURE: 67 MMHG | BODY MASS INDEX: 21.54 KG/M2 | HEIGHT: 72 IN | RESPIRATION RATE: 14 BRPM | OXYGEN SATURATION: 97 % | TEMPERATURE: 98.2 F | HEART RATE: 88 BPM

## 2019-10-29 PROCEDURE — 25000132 ZZH RX MED GY IP 250 OP 250 PS 637: Performed by: SURGERY

## 2019-10-29 RX ORDER — HYDROCODONE BITARTRATE AND ACETAMINOPHEN 5; 325 MG/1; MG/1
1-2 TABLET ORAL EVERY 4 HOURS PRN
Qty: 10 TABLET | Refills: 0 | Status: SHIPPED | OUTPATIENT
Start: 2019-10-29 | End: 2019-11-19

## 2019-10-29 RX ADMIN — HYDROCODONE BITARTRATE AND ACETAMINOPHEN 1 TABLET: 5; 325 TABLET ORAL at 04:23

## 2019-10-29 NOTE — PLAN OF CARE
PRIMARY DIAGNOSIS: R Thyroid Lobectomy   OUTPATIENT/OBSERVATION GOALS TO BE MET BEFORE DISCHARGE:  1. Stable vital signs Yes  2. Tolerating diet: ate 25%.   3. Pain controlled with oral pain medications:  Yes  4. Positive bowel sounds:  Yes  5. Voiding without difficulty:  Burgess in place   6. Able to ambulate:  Yes  7. Provider specific discharge goals met:  Yes    Discharge Planner Nurse   Safe discharge environment identified: Yes  Barriers to discharge: Yes       Entered by: ANGÉLICA PINK 10/28/2019      Please review provider order for any additional goals.   Nurse to notify provider when observation goals have been met and patient is ready for discharge.  Vital signs:  Temp: 98.3  F (36.8  C) Temp src: Oral BP: 120/62 Pulse: 93 Heart Rate: 95 Resp: 15 SpO2: 99 % O2 Device: None (Room air) Alert and oriented x4. Complained headache at supper time. Tylenol given but was ineffective. Norco given and pt stated med is moderately effective. Fluid encouraged. Ambulated in the room with assist of x1. CAPNO on. CAMILA drain to bulb suction. Total 13mL bright red output drained. Dressing intact and clean. Saline locked. BS active and present. Will continue to monitor.

## 2019-10-29 NOTE — PROGRESS NOTES
"[facility]  General Surgery Progress Note           Assessment and Plan:   Assessment:   POD#1 s/p Procedure(s):  Right Thyroid Lobectomy, Isthmusectomy  Doing well, no issues with bleeding.      Plan:   -Discharge today    RTC with Dr. Vidal in 1-3 weeks.  RTC with Endocrinologist in 4-6 weeks.         Interval History:   No complaints.  Has not voided yet.         Physical Exam:   Blood pressure 107/53, pulse 88, temperature 98.9  F (37.2  C), temperature source Oral, resp. rate 12, height 1.819 m (5' 11.61\"), weight 72.1 kg (159 lb), SpO2 96 %.    I/O last 3 completed shifts:  In: 1100 [I.V.:1100]  Out: 2778 [Urine:2750; Drains:28]    Neck - flat, no seroma/hematoma.  Incision - clean, dry, intact.  No erythema.  Drain - No leakage around site.  Output: pink/light pink & thin.  DC'd without issue.  Chvostek's - negative          Data:   No results for input(s): RAFAEL, PTHI, HGB in the last 86677 hours.     Fidelia Vidal MD     "

## 2019-10-29 NOTE — PLAN OF CARE
PRIMARY DIAGNOSIS: R Thyroid Lobectomy  OUTPATIENT/OBSERVATION GOALS TO BE MET BEFORE DISCHARGE:  1. Stable vital signs Yes  2. Tolerating diet:Yes  3. Pain controlled with oral pain medications:  Yes  4. Positive bowel sounds:  Yes  5. Voiding without difficulty:  Burgess in place   6. Able to ambulate:  Yes  7. Provider specific discharge goals met:  No    Discharge Planner Nurse   Safe discharge environment identified: Yes  Barriers to discharge: Yes       Entered by: ANGÉLICA PINK 10/28/2019      Please review provider order for any additional goals.   Nurse to notify provider when observation goals have been met and patient is ready for discharge.    Vital signs:  Temp: 97.6  F (36.4  C) Temp src: Oral BP: 121/60 Pulse: 95 Heart Rate: 102 Resp: 15 SpO2: 100 % O2 Device: None (Room air) Alert and oriented x4. Denies pain. Upgraded to regular diet. CAMILA drain to bulb suction and draining bright red without problem. SL. CAPNO on. Will continue to monitor.

## 2019-10-29 NOTE — PLAN OF CARE
PRIMARY DIAGNOSIS: R Thyroid Lobectomy   OUTPATIENT/OBSERVATION GOALS TO BE MET BEFORE DISCHARGE:  1. Stable vital signs Yes  2. Tolerating diet: ate 25%.   3. Pain controlled with oral pain medications:  Yes  4. Positive bowel sounds:  Yes  5. Voiding without difficulty:  Burgess in place   6. Able to ambulate:  Yes  7. Provider specific discharge goals met:  Yes    Discharge Planner Nurse   Safe discharge environment identified: Yes  Barriers to discharge: Yes       Entered by: Saundra Gillis 10/29/2019        VSS. Denies nausea/pain. SBA in room. Burgess in place-will remove in AM. Capno WDL. CAMILA to bulb suction, dressing CDI. PIV SL. Tolerating diet. Will continue to monitor.     Please review provider order for any additional goals.   Nurse to notify provider when observation goals have been met and patient is ready for discharge.

## 2019-10-29 NOTE — PLAN OF CARE
"Pt ambulating well, voiding without difficulty, tolerated breakfast with nausea, and remains afebrile. Per MD's orders, pt ok to discharge home.    /67 (BP Location: Left arm)   Pulse 88   Temp 98.2  F (36.8  C) (Oral)   Resp 14   Ht 1.819 m (5' 11.61\")   Wt 72.1 kg (159 lb)   SpO2 97%   BMI 21.80 kg/m        Patient's After Visit Summary was reviewed with patient and spouse.  Patient verbalized understanding of After Visit Summary, recommended follow up and was given an opportunity to ask questions.   Discharge medications sent home with patient/family: YES   Discharged with spouse        OBSERVATION patient END time: 1310  "

## 2019-10-29 NOTE — PLAN OF CARE
"PRIMARY DIAGNOSIS: s/p R thyroidectomy  OUTPATIENT/OBSERVATION GOALS TO BE MET BEFORE DISCHARGE:  1. Stable vital signs Yes  2. Tolerating diet: pt has not ordered breakfast yet, will monitor how well he tolerates. Reported that he was nauseated with emesis last night with dinner.  3. Pain controlled with oral pain medications: no pain meds given - pt using ice chips and ice for pain relief, declining further pain interventions  4. Positive bowel sounds:  Yes, reports passing gas  5. Voiding without difficulty:  due to void - kirby pulled at 0600  6. Able to ambulate:  Yes, SBA via gait belt  7. Provider specific discharge goals met:  No. Will encourage pt to ambulate, void, and eat.    Discharge Planner Nurse   Safe discharge environment identified: Yes  Barriers to discharge: No       Entered by: Whitney Elder 10/29/2019      Please review provider order for any additional goals.   Nurse to notify provider when observation goals have been met and patient is ready for discharge.    /53 (BP Location: Left arm)   Pulse 88   Temp 98.9  F (37.2  C) (Oral)   Resp 12   Ht 1.819 m (5' 11.61\")   Wt 72.1 kg (159 lb)   SpO2 96%   BMI 21.80 kg/m    "

## 2019-10-31 LAB — COPATH REPORT: NORMAL

## 2019-11-19 ENCOUNTER — OFFICE VISIT (OUTPATIENT)
Dept: SURGERY | Facility: CLINIC | Age: 39
End: 2019-11-19
Payer: COMMERCIAL

## 2019-11-19 VITALS
RESPIRATION RATE: 16 BRPM | SYSTOLIC BLOOD PRESSURE: 118 MMHG | DIASTOLIC BLOOD PRESSURE: 68 MMHG | OXYGEN SATURATION: 95 % | HEART RATE: 64 BPM

## 2019-11-19 DIAGNOSIS — Z09 SURGICAL FOLLOWUP VISIT: Primary | ICD-10-CM

## 2019-11-19 PROCEDURE — 99024 POSTOP FOLLOW-UP VISIT: CPT | Performed by: SURGERY

## 2019-11-19 NOTE — PROGRESS NOTES
Progress Note/Post-op Follow up:  Long is here for follow up after right thyroid lobectomy 3 weeks ago.  He reports good energy.  Denies numbness or tingling.  Incisional discomfort is nearly resolved.    Smoking History:  has never smoked.    Physical Exam:  There were no vitals taken for this visit.  General:  Appears well, in no acute distress  HEENT:  Chvostek's sign is not done.   Neck:  Incision site healing nicely, Healing ridge is average.             Range of motion is normal.  Neuro:  Voice is Normal.    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    Assessment and Plan:  Long is doing well after right thyroid lobectomy.  I recommend a follow up with Dr. Maria Esther Welch or his PCP for testing of his thyroid function sometime in the next several weeks.  If his remaining lobe is functioning adequately, then checking the function annually might be prudent.    I would be happy to see him if there are any ongoing issues, but currently, there are no signs of post-operative complications.    Fidelia Vidal MD  Please route or send letter to:  Primary Care Provider (PCP) and Referring Provider      11/19/2019       Dr. Maria Esther Welch     Re: Long Rider     Dear Maria Esther :    I saw Long back today following right thyroid lobectomy. He feels well.  The incision site is healing nicely. His voice is normal.  He reports reasonable energy.  He is following up with his PCP next week.    I have included the operative and pathology reports for your records.  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  He will schedule a follow-up appointment with you only if management of thyroid replacement is required.  Otherwise, I believe his PCP will be checking his levels next week. Thank you for asking me to see this delightful patient.  Please contact me if you have any questions regarding her surgical care.    Sincerely,           Fidelia Vidal MD         Please send letter  to:  Referring Provider/Endocrinologist and PCP.  Operative and pathology report to Dr. Maria Esther Welch please.

## 2019-11-19 NOTE — LETTER
2019     Re: Long Rider, : 1980     Dear Maria Esther :     I saw Long back today following right thyroid lobectomy. He feels well.  The incision site is healing nicely. His voice is normal.  He reports reasonable energy.  He is following up with his PCP next week.     I have included the operative and pathology reports for your records.  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  He will schedule a follow-up appointment with you only if management of thyroid replacement is required.  Otherwise, I believe his PCP will be checking his levels next week. Thank you for asking me to see this delightful patient.  Please contact me if you have any questions regarding her surgical care.     Sincerely,        Fidelia Vidal MD   General Surgery Operative Note     PREOPERATIVE DIAGNOSIS:  Right thyroid symptomatic nodule     POSTOPERATIVE DIAGNOSIS:  Right thyroid symptomatic nodule     PROCEDURE:   Right Thyroid Lobectomy     ANESTHESIA:  General.     PREOPERATIVE MEDICATIONS:  Ancef 2 gm     SURGEON:  Fidelia Vidal MD     ASSISTANT:  Saray Calixto PA-C,  - the physician assistant was medically necessary in providing adequate exposure in the operating field, maintaining hemostasis, cutting suture, clamping and ligating blood vessels, and visualization of the anatomic structures throughout the surgical procedure.     ESTIMATED BLOOD LOSS:  25 cc     INDICATIONS:  Long Rider is a 39 year old male with a thyroid nodule for three years.  It is increasing in size and is symptomatic.  A fine needle aspiration biopsy showed that it is benign.  It is 5.5 cm and he is here today for right thyroid lobectomy, possible total thyroidectomy.     PROCEDURE:  Long was placed supine, head and neck in extension and a bump between the scapulas.  Tansverse cervical neck creases had been marked in the preinduction area and the one most suitable was utilized for exposure.   Superior and inferior skin flaps raised.  Midline fascia opened and reflected to the right.   The right lobe was nearly replaced with a large smooth nodule.  We did have to partially divide the strap muscles for exposure.  The upper pole was taken down by double ligation and division.  Middle thyroidal vein was ligated and the inferior pole veins ligated and the gland reflected medially, up and out of the incision.  Posterior dissection was then done under direct vision after the gland was reflected medially.  The superior parathyroid and the recurrent laryngeal nerve were seen and preserved during that posterior dissection.  The ligament of Saldana was taken down meticulously.  The inferior thyroidal artery was ligated and divided.  The inferior parathyroid was also seen and preserved.  Once we dissected across the anterior trachea, we divided through the isthmus just to the left of a 1.2 cm isthmus nodule and frozen section confirmed a benign appearing follicular lesion.  We then irrigated the site, inspected for hemostasis and closed over a 10 round CAMILA drain with 2-0 Vicryl U stitch for the strap muscle, running 3-0 Vicryl for the midline fascia, interrupted 3-0 Vicryl for platysma and 4-0 subcuticular Monocryl for skin.  The patient was transferred to recovery in good condition.     INTRAOPERATIVE FINDINGS:    1.  Thyroid benign by frozen section  2.   Right recurrent laryngeal nerve seen and preserved.  Function confirmed by nerve monitor.  3.  Right superior and inferior parathyroid glands seen and preserved.     Specimens:       ID Type Source Tests Collected by Time Destination   A : RIGHT THYROID LOBE AND ISTHMUS, suture marks superior pole Tissue Thyroid, Right SURGICAL PATHOLOGY EXAM Fidelia Vidal MD 10/28/2019  9:20 AM           Fidelia Vidal MD     Copath Report 10/28/2019  9:20 AM 88   Patient Name: RAINA MA   MR#: [id number]   Specimen #: [id number]   Collected: 10/28/2019   Received:  "10/28/2019   Reported: 10/31/2019 15:09   Ordering Phy(s): AUDRA DICKINSON     For improved result formatting, select 'View Enhanced Report Format' under    Linked Documents section.     SPECIMEN(S):   Thyroid, right lobe and isthmus     FINAL DIAGNOSIS:   Thyroid gland, right thyroid lobe, right thyroid lobectomy-   - Consistent with Hurthle cell adenoma (4.5 cm).   - Small hyperplastic nodule within isthmus (0.7 cm).   - No lymph nodes submitted or found.   - No parathyroid tissue identified.   - Prior biopsy site changes.   - Negative for malignancy.     Electronically signed out by:     Brooklyn Jorgensen M.D.     CLINICAL HISTORY:   39-year-old male with a thyroid nodule for three years, now increasing in   size (5.5 cm) and symptomatic. FNA   reported benign (2016 and 2018).     GROSS:   The specimen is received fresh for frozen section labeled with the   patient's name, identifying information and   designated \"right thyroid lobe and Isthmus, suture marks superior pole\".     It consists of a 44 g oriented right   hemithyroidectomy specimen, measuring 5 x 3.6 x 3.5 cm.  A suture   identifies the superior aspect.  The intact   capsule is inked as follows: Anterior-blue, posterior-black, and isthmus   margin-green.  Sectioning reveals a   4.5 x 3.7 x 3.5 cm thinly encapsulated circumscribed nodule.  The nodule   occupies 90% of the right lobe, and   has a brown, soft homogeneous cut surface.  This nodule abuts the anterior    and posterior aspect of the capsule   and is approximately 2 cm from isthmus.  The nodule is representatively   submitted for frozen section and two   H&E stained imprints are prepared. Upon further sectioning, a 0.7 cm tan   homogeneous nodule is identified   within the isthmus.  The remaining cut surfaces are red-brown and beefy.     Entirely submitted.     Summary of Sections:   A1 - largest nodule, frozen section.   A2-A14 - entire capsule of the largest nodule, submitted sequentially "   superior to inferior.   A15 - smaller isthmus nodule with green inked isthmus.   (Dictated by: ANI Meraz 10/28/2019 03:30 PM)     INTRAOPERATIVE CONSULTATION:   Frozen section:   Right thyroid lobe nodule: Follicular lesion, benign on frozen section   sample.  Final diagnosis pending   permanent sections.  (Dr. ELDER)     MICROSCOPIC:   Microscopic examination is performed.     The sections of the large nodule reveal features consistent with Hurthle   cell adenoma. Deeper levels are   obtained on selected blocks (#3, 5, 10, and 14) to exclude capsular and   vascular involvement. Moreover, a   smaller benign hyperplastic nodule is identified within the isthmus (block    #15); the lesional cells within the   smaller nodule are thyroglobulin+ and calcitonin-. These stains are   performed using appropriate controls.     Selected slides are reviewed internally by an additional pathologist (LUANNE)    who concurs with the diagnosis.

## 2019-11-19 NOTE — LETTER
2019    RE: Long Rider , : 1980      Progress Note/Post-op Follow up:  Long is here for follow up after right thyroid lobectomy 3 weeks ago.  He reports good energy.  Denies numbness or tingling.  Incisional discomfort is nearly resolved.     Smoking History:  has never smoked.     Physical Exam:  There were no vitals taken for this visit.  General:  Appears well, in no acute distress  HEENT:  Chvostek's sign is not done.   Neck:  Incision site healing nicely, Healing ridge is average.             Range of motion is normal.  Neuro:  Voice is Normal.     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     Assessment and Plan:  Long is doing well after right thyroid lobectomy.  I recommend a follow up with Dr. Maria Esther Welch or his PCP for testing of his thyroid function sometime in the next several weeks.  If his remaining lobe is functioning adequately, then checking the function annually might be prudent.    I would be happy to see him if there are any ongoing issues, but currently, there are no signs of post-operative complications.     Fidelia Vidal MD

## 2020-03-11 ENCOUNTER — HEALTH MAINTENANCE LETTER (OUTPATIENT)
Age: 40
End: 2020-03-11

## 2021-01-03 ENCOUNTER — HEALTH MAINTENANCE LETTER (OUTPATIENT)
Age: 41
End: 2021-01-03

## 2021-04-25 ENCOUNTER — HEALTH MAINTENANCE LETTER (OUTPATIENT)
Age: 41
End: 2021-04-25

## 2021-10-10 ENCOUNTER — HEALTH MAINTENANCE LETTER (OUTPATIENT)
Age: 41
End: 2021-10-10

## 2022-05-21 ENCOUNTER — HEALTH MAINTENANCE LETTER (OUTPATIENT)
Age: 42
End: 2022-05-21

## 2022-09-18 ENCOUNTER — HEALTH MAINTENANCE LETTER (OUTPATIENT)
Age: 42
End: 2022-09-18

## 2023-06-04 ENCOUNTER — HEALTH MAINTENANCE LETTER (OUTPATIENT)
Age: 43
End: 2023-06-04

## (undated) DEVICE — ESU GROUND PAD ADULT W/CORD E7507

## (undated) DEVICE — SU MONOCRYL 4-0 P-3 18" UND Y494G

## (undated) DEVICE — SU VICRYL 3-0 SH 27" UND J416H

## (undated) DEVICE — GLOVE PROTEXIS BLUE W/NEU-THERA 6.5  2D73EB65

## (undated) DEVICE — LINEN FULL SHEET 5511

## (undated) DEVICE — CATH FOLEY 14FR 5ML SILICONE LUBRI-SIL 175814

## (undated) DEVICE — TUBING SMOKE EVAC 3/8"X10' E3645

## (undated) DEVICE — DRAIN JACKSON PRATT RESERVOIR 100ML SU130-1305

## (undated) DEVICE — GLOVE PROTEXIS POWDER FREE 6.5 ORTHOPEDIC 2D73ET65

## (undated) DEVICE — BLADE CLIPPER 3M 9670

## (undated) DEVICE — SOL NACL 0.9% IRRIG 1000ML BOTTLE 2F7124

## (undated) DEVICE — ESU ELEC BLADE 2.75" COATED/INSULATED E1455

## (undated) DEVICE — DRAIN JACKSON PRATT 10FR ROUND SU130-1321

## (undated) DEVICE — DEVICE POSITIONING ARM DEVON HI DENSITY FOAM 31143491

## (undated) DEVICE — SUCTION CANISTER MEDIVAC LINER 3000ML W/LID 65651-530

## (undated) DEVICE — ESU PENCIL W/HOLSTER E2350H

## (undated) DEVICE — LINEN HALF SHEET 5512

## (undated) DEVICE — ESU HANDPIECE THUNDERBEAT ENDOSCOPIC FINE JAW TB-00090F

## (undated) DEVICE — PROBE NEUROSIGN MAGSTIM BIPOLAR 3601-00-TE

## (undated) DEVICE — LINEN ORTHO ACL PACK 5447

## (undated) DEVICE — CATH TRAY FOLEY COUDE SURESTEP 16FR W/DRN BAG LATEX A304416A

## (undated) DEVICE — SU VICRYL 2-0 TIE 12X18" J905T

## (undated) DEVICE — SU SILK 3-0 SH 30" K832H

## (undated) DEVICE — PREFILTER SMOKE EVAC E6330

## (undated) DEVICE — PACK MAJOR HEAD AND NECK RIDGES

## (undated) DEVICE — SU VICRYL 4-0 TIE 12X18" DYED J103T

## (undated) DEVICE — BAG CLEAR TRASH 1.3M 39X33" P4040C

## (undated) DEVICE — TUBING SMOKE EVAC ATTACHMENT E3590

## (undated) DEVICE — SU VICRYL 2-0 SH 27" J317H

## (undated) RX ORDER — BUPIVACAINE HYDROCHLORIDE 2.5 MG/ML
INJECTION, SOLUTION EPIDURAL; INFILTRATION; INTRACAUDAL
Status: DISPENSED
Start: 2019-10-28

## (undated) RX ORDER — CEFAZOLIN SODIUM 2 G/100ML
INJECTION, SOLUTION INTRAVENOUS
Status: DISPENSED
Start: 2019-10-28

## (undated) RX ORDER — ONDANSETRON 2 MG/ML
INJECTION INTRAMUSCULAR; INTRAVENOUS
Status: DISPENSED
Start: 2019-10-28

## (undated) RX ORDER — FENTANYL CITRATE 50 UG/ML
INJECTION, SOLUTION INTRAMUSCULAR; INTRAVENOUS
Status: DISPENSED
Start: 2019-10-28